# Patient Record
Sex: FEMALE | Race: WHITE | Employment: UNEMPLOYED | URBAN - METROPOLITAN AREA
[De-identification: names, ages, dates, MRNs, and addresses within clinical notes are randomized per-mention and may not be internally consistent; named-entity substitution may affect disease eponyms.]

---

## 2018-07-31 ENCOUNTER — OP HISTORICAL/CONVERTED ENCOUNTER (OUTPATIENT)
Dept: OTHER | Age: 58
End: 2018-07-31

## 2019-04-12 ENCOUNTER — OP HISTORICAL/CONVERTED ENCOUNTER (OUTPATIENT)
Dept: OTHER | Age: 59
End: 2019-04-12

## 2019-08-21 ENCOUNTER — OP HISTORICAL/CONVERTED ENCOUNTER (OUTPATIENT)
Dept: OTHER | Age: 59
End: 2019-08-21

## 2019-09-01 ENCOUNTER — OP HISTORICAL/CONVERTED ENCOUNTER (OUTPATIENT)
Dept: OTHER | Age: 59
End: 2019-09-01

## 2021-08-03 ENCOUNTER — TRANSCRIBE ORDER (OUTPATIENT)
Dept: REGISTRATION | Age: 61
End: 2021-08-03

## 2021-08-03 ENCOUNTER — HOSPITAL ENCOUNTER (OUTPATIENT)
Dept: LAB | Age: 61
Discharge: HOME | End: 2021-08-03
Payer: MEDICAID

## 2021-08-03 ENCOUNTER — HOSPITAL ENCOUNTER (OUTPATIENT)
Dept: GENERAL RADIOLOGY | Age: 61
Discharge: HOME | End: 2021-08-03
Payer: MEDICAID

## 2021-08-03 DIAGNOSIS — R06.02 SHORTNESS OF BREATH: ICD-10-CM

## 2021-08-03 DIAGNOSIS — R06.02 SHORTNESS OF BREATH: Primary | ICD-10-CM

## 2021-08-03 DIAGNOSIS — R06.02 SOB (SHORTNESS OF BREATH): ICD-10-CM

## 2021-08-03 DIAGNOSIS — R06.02 SOB (SHORTNESS OF BREATH): Primary | ICD-10-CM

## 2021-08-03 LAB
ALBUMIN SERPL-MCNC: 4 G/DL (ref 3.5–5)
ALBUMIN/GLOB SERPL: 1 {RATIO} (ref 1.1–2.2)
ALP SERPL-CCNC: 102 U/L (ref 45–117)
ALT SERPL-CCNC: 20 U/L (ref 12–78)
ANION GAP SERPL CALC-SCNC: 5 MMOL/L (ref 5–15)
AST SERPL W P-5'-P-CCNC: 14 U/L (ref 15–37)
BASOPHILS # BLD: 0.1 K/UL (ref 0–0.1)
BASOPHILS NFR BLD: 1 % (ref 0–1)
BILIRUB SERPL-MCNC: 0.8 MG/DL (ref 0.2–1)
BNP SERPL-MCNC: 817 PG/ML
BUN SERPL-MCNC: 17 MG/DL (ref 6–20)
BUN/CREAT SERPL: 16 (ref 12–20)
CA-I BLD-MCNC: 9.7 MG/DL (ref 8.5–10.1)
CHLORIDE SERPL-SCNC: 105 MMOL/L (ref 97–108)
CO2 SERPL-SCNC: 27 MMOL/L (ref 21–32)
CREAT SERPL-MCNC: 1.05 MG/DL (ref 0.55–1.02)
DIFFERENTIAL METHOD BLD: NORMAL
EOSINOPHIL # BLD: 0.1 K/UL (ref 0–0.4)
EOSINOPHIL NFR BLD: 1 % (ref 0–7)
ERYTHROCYTE [DISTWIDTH] IN BLOOD BY AUTOMATED COUNT: 12.5 % (ref 11.5–14.5)
GLOBULIN SER CALC-MCNC: 4.2 G/DL (ref 2–4)
GLUCOSE SERPL-MCNC: 107 MG/DL (ref 65–100)
HCT VFR BLD AUTO: 43.7 % (ref 35–47)
HGB BLD-MCNC: 14.2 G/DL (ref 11.5–16)
IMM GRANULOCYTES # BLD AUTO: 0 K/UL (ref 0–0.04)
IMM GRANULOCYTES NFR BLD AUTO: 0 % (ref 0–0.5)
LYMPHOCYTES # BLD: 1.4 K/UL (ref 0.8–3.5)
LYMPHOCYTES NFR BLD: 19 % (ref 12–49)
MCH RBC QN AUTO: 31.8 PG (ref 26–34)
MCHC RBC AUTO-ENTMCNC: 32.5 G/DL (ref 30–36.5)
MCV RBC AUTO: 97.8 FL (ref 80–99)
MONOCYTES # BLD: 0.4 K/UL (ref 0–1)
MONOCYTES NFR BLD: 6 % (ref 5–13)
NEUTS SEG # BLD: 5.1 K/UL (ref 1.8–8)
NEUTS SEG NFR BLD: 73 % (ref 32–75)
NRBC # BLD: 0 K/UL (ref 0–0.01)
NRBC BLD-RTO: 0 PER 100 WBC
PLATELET # BLD AUTO: 314 K/UL (ref 150–400)
PMV BLD AUTO: 10.3 FL (ref 8.9–12.9)
POTASSIUM SERPL-SCNC: 4.1 MMOL/L (ref 3.5–5.1)
PROT SERPL-MCNC: 8.2 G/DL (ref 6.4–8.2)
RBC # BLD AUTO: 4.47 M/UL (ref 3.8–5.2)
SODIUM SERPL-SCNC: 137 MMOL/L (ref 136–145)
TSH SERPL DL<=0.05 MIU/L-ACNC: 1.98 UIU/ML (ref 0.36–3.74)
WBC # BLD AUTO: 7 K/UL (ref 3.6–11)

## 2021-08-03 PROCEDURE — 83880 ASSAY OF NATRIURETIC PEPTIDE: CPT

## 2021-08-03 PROCEDURE — 36415 COLL VENOUS BLD VENIPUNCTURE: CPT

## 2021-08-03 PROCEDURE — 85025 COMPLETE CBC W/AUTO DIFF WBC: CPT

## 2021-08-03 PROCEDURE — 84439 ASSAY OF FREE THYROXINE: CPT

## 2021-08-03 PROCEDURE — 71046 X-RAY EXAM CHEST 2 VIEWS: CPT

## 2021-08-03 PROCEDURE — 80053 COMPREHEN METABOLIC PANEL: CPT

## 2021-08-03 PROCEDURE — 84443 ASSAY THYROID STIM HORMONE: CPT

## 2021-08-04 LAB — T4 FREE SERPL-MCNC: 1 NG/DL (ref 0.8–1.5)

## 2021-11-22 ENCOUNTER — APPOINTMENT (OUTPATIENT)
Dept: GENERAL RADIOLOGY | Age: 61
End: 2021-11-22
Attending: STUDENT IN AN ORGANIZED HEALTH CARE EDUCATION/TRAINING PROGRAM
Payer: MEDICAID

## 2021-11-22 ENCOUNTER — HOSPITAL ENCOUNTER (OUTPATIENT)
Age: 61
Setting detail: OBSERVATION
Discharge: HOME | End: 2021-11-24
Attending: STUDENT IN AN ORGANIZED HEALTH CARE EDUCATION/TRAINING PROGRAM | Admitting: INTERNAL MEDICINE
Payer: MEDICAID

## 2021-11-22 DIAGNOSIS — M25.562 ARTHRALGIA OF LEFT LOWER LEG: ICD-10-CM

## 2021-11-22 DIAGNOSIS — R26.2 AMBULATORY DYSFUNCTION: Primary | ICD-10-CM

## 2021-11-22 LAB
ALBUMIN SERPL-MCNC: 3.2 G/DL (ref 3.5–5)
ALBUMIN/GLOB SERPL: 0.9 {RATIO} (ref 1.1–2.2)
ALP SERPL-CCNC: 142 U/L (ref 45–117)
ALT SERPL-CCNC: 13 U/L (ref 12–78)
ANION GAP SERPL CALC-SCNC: 6 MMOL/L (ref 5–15)
AST SERPL W P-5'-P-CCNC: 18 U/L (ref 15–37)
BASOPHILS # BLD: 0.1 K/UL (ref 0–0.1)
BASOPHILS NFR BLD: 1 % (ref 0–1)
BILIRUB SERPL-MCNC: 0.4 MG/DL (ref 0.2–1)
BUN SERPL-MCNC: 12 MG/DL (ref 6–20)
BUN/CREAT SERPL: 17 (ref 12–20)
CA-I BLD-MCNC: 9.1 MG/DL (ref 8.5–10.1)
CHLORIDE SERPL-SCNC: 112 MMOL/L (ref 97–108)
CO2 SERPL-SCNC: 24 MMOL/L (ref 21–32)
CREAT SERPL-MCNC: 0.7 MG/DL (ref 0.55–1.02)
D DIMER PPP FEU-MCNC: 2.22 UG/ML(FEU)
DIFFERENTIAL METHOD BLD: ABNORMAL
EOSINOPHIL # BLD: 0.1 K/UL (ref 0–0.4)
EOSINOPHIL NFR BLD: 1 % (ref 0–7)
ERYTHROCYTE [DISTWIDTH] IN BLOOD BY AUTOMATED COUNT: 14 % (ref 11.5–14.5)
GLOBULIN SER CALC-MCNC: 3.4 G/DL (ref 2–4)
GLUCOSE SERPL-MCNC: 98 MG/DL (ref 65–100)
HCT VFR BLD AUTO: 36 % (ref 35–47)
HGB BLD-MCNC: 11.2 G/DL (ref 11.5–16)
IMM GRANULOCYTES # BLD AUTO: 0 K/UL (ref 0–0.04)
IMM GRANULOCYTES NFR BLD AUTO: 1 % (ref 0–0.5)
INR PPP: 3.1 (ref 0.9–1.1)
LYMPHOCYTES # BLD: 1.1 K/UL (ref 0.8–3.5)
LYMPHOCYTES NFR BLD: 18 % (ref 12–49)
MCH RBC QN AUTO: 30.2 PG (ref 26–34)
MCHC RBC AUTO-ENTMCNC: 31.1 G/DL (ref 30–36.5)
MCV RBC AUTO: 97 FL (ref 80–99)
MONOCYTES # BLD: 0.6 K/UL (ref 0–1)
MONOCYTES NFR BLD: 10 % (ref 5–13)
NEUTS SEG # BLD: 4.5 K/UL (ref 1.8–8)
NEUTS SEG NFR BLD: 69 % (ref 32–75)
NRBC # BLD: 0 K/UL (ref 0–0.01)
NRBC BLD-RTO: 0 PER 100 WBC
PLATELET # BLD AUTO: 314 K/UL (ref 150–400)
PMV BLD AUTO: 9 FL (ref 8.9–12.9)
POTASSIUM SERPL-SCNC: 3.7 MMOL/L (ref 3.5–5.1)
PROT SERPL-MCNC: 6.6 G/DL (ref 6.4–8.2)
PROTHROMBIN TIME: 31.1 SEC (ref 11.9–14.7)
RBC # BLD AUTO: 3.71 M/UL (ref 3.8–5.2)
SODIUM SERPL-SCNC: 142 MMOL/L (ref 136–145)
WBC # BLD AUTO: 6.4 K/UL (ref 3.6–11)

## 2021-11-22 PROCEDURE — 73562 X-RAY EXAM OF KNEE 3: CPT

## 2021-11-22 PROCEDURE — 99284 EMERGENCY DEPT VISIT MOD MDM: CPT

## 2021-11-22 PROCEDURE — 85610 PROTHROMBIN TIME: CPT

## 2021-11-22 PROCEDURE — 85025 COMPLETE CBC W/AUTO DIFF WBC: CPT

## 2021-11-22 PROCEDURE — 80053 COMPREHEN METABOLIC PANEL: CPT

## 2021-11-22 PROCEDURE — 85379 FIBRIN DEGRADATION QUANT: CPT

## 2021-11-22 PROCEDURE — 74011250637 HC RX REV CODE- 250/637: Performed by: STUDENT IN AN ORGANIZED HEALTH CARE EDUCATION/TRAINING PROGRAM

## 2021-11-22 PROCEDURE — 36415 COLL VENOUS BLD VENIPUNCTURE: CPT

## 2021-11-22 RX ORDER — OXYCODONE AND ACETAMINOPHEN 5; 325 MG/1; MG/1
1 TABLET ORAL
Status: COMPLETED | OUTPATIENT
Start: 2021-11-22 | End: 2021-11-22

## 2021-11-22 RX ORDER — FLUTICASONE PROPIONATE AND SALMETEROL 250; 50 UG/1; UG/1
POWDER RESPIRATORY (INHALATION)
COMMUNITY

## 2021-11-22 RX ORDER — TOPIRAMATE 25 MG/1
25 TABLET ORAL 2 TIMES DAILY WITH MEALS
COMMUNITY
Start: 2021-11-18

## 2021-11-22 RX ORDER — LORAZEPAM 1 MG/1
1 TABLET ORAL EVERY 4 HOURS
COMMUNITY
Start: 2021-11-14

## 2021-11-22 RX ORDER — ALBUTEROL SULFATE 0.83 MG/ML
SOLUTION RESPIRATORY (INHALATION)
COMMUNITY

## 2021-11-22 RX ORDER — OXYCODONE AND ACETAMINOPHEN 10; 325 MG/1; MG/1
1 TABLET ORAL
COMMUNITY
Start: 2021-11-14

## 2021-11-22 RX ORDER — LORATADINE 10 MG/1
TABLET ORAL
COMMUNITY
Start: 2021-11-14

## 2021-11-22 RX ORDER — ALBUTEROL SULFATE 90 UG/1
AEROSOL, METERED RESPIRATORY (INHALATION)
COMMUNITY

## 2021-11-22 RX ORDER — CHOLECALCIFEROL (VITAMIN D3) 125 MCG
5000 CAPSULE ORAL DAILY
COMMUNITY
Start: 2021-11-17

## 2021-11-22 RX ORDER — NALOXONE HYDROCHLORIDE 4 MG/.1ML
SPRAY NASAL
COMMUNITY
Start: 2021-08-30

## 2021-11-22 RX ORDER — DILTIAZEM HYDROCHLORIDE 360 MG/1
360 CAPSULE, EXTENDED RELEASE ORAL DAILY
COMMUNITY
Start: 2021-10-01

## 2021-11-22 RX ORDER — WARFARIN 3 MG/1
3 TABLET ORAL DAILY
COMMUNITY

## 2021-11-22 RX ORDER — LEVOTHYROXINE SODIUM 50 UG/1
50 TABLET ORAL
COMMUNITY
Start: 2021-10-10

## 2021-11-22 RX ADMIN — OXYCODONE AND ACETAMINOPHEN 1 TABLET: 5; 325 TABLET ORAL at 23:52

## 2021-11-22 NOTE — Clinical Note
Patient Class[de-identified] OBSERVATION [254]   Type of Bed: Medical [8]   Reason for Observation: Ambulatory Dysfunction   Admitting Diagnosis: Ambulatory dysfunction [[id number]]   Admitting Physician: Roise Goldberg [[id number]]   Attending Physician: Roise Goldberg [[id number]]

## 2021-11-23 ENCOUNTER — APPOINTMENT (OUTPATIENT)
Dept: NON INVASIVE DIAGNOSTICS | Age: 61
End: 2021-11-23
Attending: INTERNAL MEDICINE
Payer: MEDICAID

## 2021-11-23 PROBLEM — R26.2 AMBULATORY DYSFUNCTION: Status: ACTIVE | Noted: 2021-11-23

## 2021-11-23 LAB — MRSA DNA SPEC QL NAA+PROBE: NOT DETECTED

## 2021-11-23 PROCEDURE — 93971 EXTREMITY STUDY: CPT

## 2021-11-23 PROCEDURE — 94640 AIRWAY INHALATION TREATMENT: CPT

## 2021-11-23 PROCEDURE — G0378 HOSPITAL OBSERVATION PER HR: HCPCS

## 2021-11-23 PROCEDURE — 74011250636 HC RX REV CODE- 250/636: Performed by: INTERNAL MEDICINE

## 2021-11-23 PROCEDURE — 87641 MR-STAPH DNA AMP PROBE: CPT

## 2021-11-23 PROCEDURE — 96374 THER/PROPH/DIAG INJ IV PUSH: CPT

## 2021-11-23 PROCEDURE — 74011250637 HC RX REV CODE- 250/637: Performed by: INTERNAL MEDICINE

## 2021-11-23 RX ORDER — WARFARIN 2.5 MG/1
5 TABLET ORAL DAILY
Status: DISCONTINUED | OUTPATIENT
Start: 2021-11-23 | End: 2021-11-23

## 2021-11-23 RX ORDER — FUROSEMIDE 10 MG/ML
40 INJECTION INTRAMUSCULAR; INTRAVENOUS ONCE
Status: COMPLETED | OUTPATIENT
Start: 2021-11-23 | End: 2021-11-23

## 2021-11-23 RX ORDER — DILTIAZEM HYDROCHLORIDE 120 MG/1
360 CAPSULE, COATED, EXTENDED RELEASE ORAL DAILY
Status: DISCONTINUED | OUTPATIENT
Start: 2021-11-23 | End: 2021-11-24 | Stop reason: HOSPADM

## 2021-11-23 RX ORDER — TOPIRAMATE 25 MG/1
25 TABLET ORAL 2 TIMES DAILY WITH MEALS
Status: DISCONTINUED | OUTPATIENT
Start: 2021-11-23 | End: 2021-11-24 | Stop reason: HOSPADM

## 2021-11-23 RX ORDER — CHOLECALCIFEROL TAB 125 MCG (5000 UNIT) 125 MCG
5000 TAB ORAL DAILY
Status: DISCONTINUED | OUTPATIENT
Start: 2021-11-23 | End: 2021-11-24 | Stop reason: HOSPADM

## 2021-11-23 RX ORDER — METOPROLOL TARTRATE 50 MG/1
100 TABLET ORAL 2 TIMES DAILY
Status: DISCONTINUED | OUTPATIENT
Start: 2021-11-23 | End: 2021-11-24 | Stop reason: HOSPADM

## 2021-11-23 RX ORDER — WARFARIN 1 MG/1
1.5 TABLET ORAL DAILY
Status: DISCONTINUED | OUTPATIENT
Start: 2021-11-23 | End: 2021-11-23

## 2021-11-23 RX ORDER — PAROXETINE 10 MG/1
10 TABLET, FILM COATED ORAL DAILY
Status: DISCONTINUED | OUTPATIENT
Start: 2021-11-23 | End: 2021-11-24 | Stop reason: HOSPADM

## 2021-11-23 RX ORDER — LORAZEPAM 1 MG/1
1 TABLET ORAL
Status: DISCONTINUED | OUTPATIENT
Start: 2021-11-23 | End: 2021-11-24 | Stop reason: HOSPADM

## 2021-11-23 RX ORDER — WARFARIN 3 MG/1
1.5 TABLET ORAL ONCE
Status: COMPLETED | OUTPATIENT
Start: 2021-11-23 | End: 2021-11-23

## 2021-11-23 RX ORDER — BUDESONIDE AND FORMOTEROL FUMARATE DIHYDRATE 160; 4.5 UG/1; UG/1
1 AEROSOL RESPIRATORY (INHALATION)
Status: DISCONTINUED | OUTPATIENT
Start: 2021-11-23 | End: 2021-11-24 | Stop reason: HOSPADM

## 2021-11-23 RX ORDER — LEVOTHYROXINE SODIUM 25 UG/1
50 TABLET ORAL
Status: DISCONTINUED | OUTPATIENT
Start: 2021-11-23 | End: 2021-11-24 | Stop reason: HOSPADM

## 2021-11-23 RX ORDER — ALBUTEROL SULFATE 0.83 MG/ML
2.5 SOLUTION RESPIRATORY (INHALATION)
Status: DISCONTINUED | OUTPATIENT
Start: 2021-11-23 | End: 2021-11-24 | Stop reason: HOSPADM

## 2021-11-23 RX ORDER — CYCLOBENZAPRINE HCL 10 MG
10 TABLET ORAL 3 TIMES DAILY
Status: DISCONTINUED | OUTPATIENT
Start: 2021-11-23 | End: 2021-11-24 | Stop reason: HOSPADM

## 2021-11-23 RX ORDER — CETIRIZINE HCL 10 MG
10 TABLET ORAL DAILY
Status: DISCONTINUED | OUTPATIENT
Start: 2021-11-23 | End: 2021-11-24 | Stop reason: HOSPADM

## 2021-11-23 RX ORDER — OXYCODONE AND ACETAMINOPHEN 5; 325 MG/1; MG/1
2 TABLET ORAL
Status: DISCONTINUED | OUTPATIENT
Start: 2021-11-23 | End: 2021-11-24 | Stop reason: HOSPADM

## 2021-11-23 RX ADMIN — Medication 5000 UNITS: at 10:30

## 2021-11-23 RX ADMIN — BUDESONIDE AND FORMOTEROL FUMARATE DIHYDRATE 1 PUFF: 160; 4.5 AEROSOL RESPIRATORY (INHALATION) at 10:00

## 2021-11-23 RX ADMIN — BUDESONIDE AND FORMOTEROL FUMARATE DIHYDRATE 1 PUFF: 160; 4.5 AEROSOL RESPIRATORY (INHALATION) at 18:14

## 2021-11-23 RX ADMIN — TOPIRAMATE 25 MG: 25 TABLET, FILM COATED ORAL at 10:31

## 2021-11-23 RX ADMIN — TOPIRAMATE 25 MG: 25 TABLET, FILM COATED ORAL at 17:29

## 2021-11-23 RX ADMIN — METOPROLOL TARTRATE 100 MG: 50 TABLET, FILM COATED ORAL at 10:30

## 2021-11-23 RX ADMIN — METOPROLOL TARTRATE 100 MG: 50 TABLET, FILM COATED ORAL at 22:06

## 2021-11-23 RX ADMIN — WARFARIN SODIUM 1.5 MG: 3 TABLET ORAL at 17:29

## 2021-11-23 RX ADMIN — DILTIAZEM HYDROCHLORIDE 360 MG: 120 CAPSULE, COATED, EXTENDED RELEASE ORAL at 10:31

## 2021-11-23 RX ADMIN — FUROSEMIDE 40 MG: 10 INJECTION, SOLUTION INTRAMUSCULAR; INTRAVENOUS at 10:56

## 2021-11-23 RX ADMIN — OXYCODONE AND ACETAMINOPHEN 2 TABLET: 5; 325 TABLET ORAL at 15:28

## 2021-11-23 RX ADMIN — OXYCODONE AND ACETAMINOPHEN 2 TABLET: 5; 325 TABLET ORAL at 20:05

## 2021-11-23 RX ADMIN — LEVOTHYROXINE SODIUM 50 MCG: 0.03 TABLET ORAL at 10:30

## 2021-11-23 RX ADMIN — CETIRIZINE HYDROCHLORIDE 10 MG: 10 TABLET, FILM COATED ORAL at 10:35

## 2021-11-23 RX ADMIN — OXYCODONE AND ACETAMINOPHEN 2 TABLET: 5; 325 TABLET ORAL at 10:55

## 2021-11-23 NOTE — PROGRESS NOTES
Writer met with patient at bedside. Observation notice provided in writing to patient and/or caregiver as well as verbal explanation of the policy. Patients who are in outpatient status also receive the Observation notice. Patient is going to [facility]. Writer will meet with patient when she returns. 1  Met with patient at bedside to discuss discharge planning. Patient states she has had [facility] before but cannot remember the name. She says she was not pleased with them and they came last Thursday to see her. Pt would prefer to go to a facility if she cannot go home, however is open to accepting [facility] if needed again. She says she does not like people to come to her home. She will wait until after PT/OT recommendations to decidie what she would like to do.      Claudia Thompson

## 2021-11-23 NOTE — PROGRESS NOTES
PT consult received and acknowledged . However , there are orders for duplex venous scan for L Le to r/o DVT. Hold PT at this time. PT will continue to  monitor and reattempt for PT eval when medically appropriate .  Thanks

## 2021-11-23 NOTE — PROGRESS NOTES
Problem: Pressure Injury - Risk of  Goal: *Prevention of pressure injury  Description: Document Alan Scale and appropriate interventions in the flowsheet. Outcome: Progressing Towards Goal  Note: Pressure Injury Interventions: Activity Interventions: PT/OT evaluation, Assess need for specialty bed    Mobility Interventions: HOB 30 degrees or less, PT/OT evaluation, Turn and reposition approx. every two hours(pillow and wedges), Assess need for specialty bed    Nutrition Interventions: Document food/fluid/supplement intake, Offer support with meals,snacks and hydration    Friction and Shear Interventions: HOB 30 degrees or less, Minimize layers                Problem: Patient Education: Go to Patient Education Activity  Goal: Patient/Family Education  Outcome: Progressing Towards Goal     Problem: Falls - Risk of  Goal: *Absence of Falls  Description: Document Zaid Fall Risk and appropriate interventions in the flowsheet.   Outcome: Progressing Towards Goal  Note: Fall Risk Interventions:  Mobility Interventions: Bed/chair exit alarm, OT consult for ADLs, PT Consult for mobility concerns, Patient to call before getting OOB, PT Consult for assist device competence         Medication Interventions: Bed/chair exit alarm, Patient to call before getting OOB, Teach patient to arise slowly    Elimination Interventions: Bed/chair exit alarm, Call light in reach, Patient to call for help with toileting needs    History of Falls Interventions: Bed/chair exit alarm, Door open when patient unattended, Room close to nurse's station         Problem: Patient Education: Go to Patient Education Activity  Goal: Patient/Family Education  Outcome: Progressing Towards Goal

## 2021-11-23 NOTE — PROGRESS NOTES
OT evaluation received and acknowledged. Per chart review, pt is pending duplex US of L LE to r/o possible DVT. OT to hold at this time and follow up with pt for evaluation at a later time pending results/as medically appropriate. Thank you.

## 2021-11-23 NOTE — H&P
History and Physical (Inpatient)    Patient:    Tato Saunders   64 y.o. Chief Complaint:   Chief Complaint   Patient presents with    Knee Pain     left         History of Present Illness: This is a 70-year-old morbidly obese  female with history of atrial fibrillation, avascular necrosis status post left hip replacement recently on  has been doing well developed acute swelling as per patient worse than after surgery difficulty walking came to the emergency room. She denies any shortness of breath or chest pain or fall. She denies any fever chills. She states her left leg feels like it is 20 pounds heavier. She has significant left knee pain and she is afraid that she has blood clot. She could not transfer and it took her a long time to be able to walk which is a setback. There is no orthopnea or PND. ROS: Denies any chronic headache blurry vision URI symptoms she denies any chest pain palpitation orthopnea PND abdominal pain nausea vomiting diarrhea constipation no bloody bowel movements. Her hip pain is better she reports left knee pain. Denies any urinary symptoms of dysuria urgency patient has functional incontinence. She denies any fever chills he reports weakness. History:  Past Medical History:   Diagnosis Date    Hypertension    Avascular necrosis of the hip. Morbid obesity  Atrial fibrillation  Panic attack  Asthma  Osteoarthritis of hip and knee   history of sepsis  Toe infection      Past surgical history status post right hip total hip replacement   Status post left total hip replacement 2021  Status post tonsillectomy      Social history  Single, , lives alone no children. On disability. She quit tobacco use does not drink alcohol.     Family History   Problem Relation Age of Onset   Elke Stabs Thyroid Disease Father    Mother is alive at 80 with a history of atrial fibrillation  Father  at 76 of lung fibrosis /CVA    Allergies:  No Known Allergies    Current Medications:    Current Facility-Administered Medications:     influenza vaccine 2021-22 (6 mos+)(PF) (FLUARIX/FLULAVAL/FLUZONE QUAD) injection 0.5 mL, 1 Each, IntraMUSCular, PRIOR TO DISCHARGE, Colby Stokes MD    oxyCODONE-acetaminophen (PERCOCET) 5-325 mg per tablet 2 Tablet, 2 Tablet, Oral, Q4H PRN, Colby Stokes MD    cholecalciferol (VITAMIN D3) (5000 Units/125 mcg) tablet 5,000 Units, 5,000 Units, Oral, DAILY, Colby RAY MD, 5,000 Units at 11/23/21 1030    levothyroxine (SYNTHROID) tablet 50 mcg, 50 mcg, Oral, ACB, Andrez Diop MD, 50 mcg at 11/23/21 1030    cetirizine (ZYRTEC) tablet 10 mg, 10 mg, Oral, DAILY, Andrez Diop MD, 10 mg at 11/23/21 1035    metoprolol tartrate (LOPRESSOR) tablet 100 mg, 100 mg, Oral, BID, Andrez Diop MD, 100 mg at 11/23/21 1030    PARoxetine (PAXIL) tablet 10 mg, 10 mg, Oral, DAILY, Andrez Diop MD    topiramate (TOPAMAX) tablet 25 mg, 25 mg, Oral, BID WITH MEALS, Andrez Diop MD, 25 mg at 11/23/21 1031    dilTIAZem ER (CARDIZEM CD) capsule 360 mg, 360 mg, Oral, DAILY, Andrez Diop MD, 360 mg at 11/23/21 1031    budesonide-formoteroL (SYMBICORT) 160-4.5 mcg/actuation HFA inhaler 1 Puff, 1 Puff, Inhalation, BID RT, Andrez Diop MD    albuterol (PROVENTIL VENTOLIN) nebulizer solution 2.5 mg, 2.5 mg, Nebulization, Q6H PRN, Colby Stokes MD    WARFARIN INFORMATION NOTE (COUMADIN), , Other, PRN, Colby Stokes MD    warfarin (COUMADIN) tablet 1.5 mg, 1.5 mg, Oral, ONCE, Adnrez Diop MD    furosemide (LASIX) injection 40 mg, 40 mg, IntraVENous, ONCE, Colby Stokes MD       Physical Exam:  Visit Vitals  BP (!) 151/84 (BP 1 Location: Right upper arm, BP Patient Position: At rest)   Pulse (!) 101   Temp 98.2 °F (36.8 °C)   Resp 18   Ht 5' 8\" (1.727 m)   Wt 117.9 kg (260 lb)   SpO2 98%   BMI 39.53 kg/m²     On examination she is an elderly looking obese  female lying in bed comfortably no respiratory distress  HEENT metabolic atraumatic anicteric sclera oral mucosa dry  Neck no distended neck vein  Lungs are clear bilaterally no wheeze or crackles  Heart S1-S2 irregularly irregular  Abdomen soft obese nontender nondistended positive bowel sounds  Left leg with significant edema extending from the thigh all the way to her ankle. Recent left hip surgery no sign of infection or wound dehiscence. CNS alert and oriented moves extremities with some pain on the left. Psych cooperative but anxious    Impression this is a 70-year-old morbidly obese  female with history of A. fib hypertension recent left hip surgery presents with inability to walk pain on the knee concern about blood clot although INR is therapeutic at 3.2. Findings/Diagnosis: Acute inability to walk with left knee pain  Left knee pain  Recent left hip surgery   left leg swelling  Atrial fibrillation stable  Hypertension  Morbid obesity  History of panic attack admitted to the hospital        Laboratory:      Recent Results (from the past 24 hour(s))   D DIMER    Collection Time: 11/22/21  9:20 PM   Result Value Ref Range    D DIMER 2.22 (H) <0.50 ug/ml(FEU)   CBC WITH AUTOMATED DIFF    Collection Time: 11/22/21  9:20 PM   Result Value Ref Range    WBC 6.4 3.6 - 11.0 K/uL    RBC 3.71 (L) 3.80 - 5.20 M/uL    HGB 11.2 (L) 11.5 - 16.0 g/dL    HCT 36.0 35.0 - 47.0 %    MCV 97.0 80.0 - 99.0 FL    MCH 30.2 26.0 - 34.0 PG    MCHC 31.1 30.0 - 36.5 g/dL    RDW 14.0 11.5 - 14.5 %    PLATELET 038 536 - 765 K/uL    MPV 9.0 8.9 - 12.9 FL    NRBC 0.0 0.0  WBC    ABSOLUTE NRBC 0.00 0.00 - 0.01 K/uL    NEUTROPHILS 69 32 - 75 %    LYMPHOCYTES 18 12 - 49 %    MONOCYTES 10 5 - 13 %    EOSINOPHILS 1 0 - 7 %    BASOPHILS 1 0 - 1 %    IMMATURE GRANULOCYTES 1 (H) 0 - 0.5 %    ABS. NEUTROPHILS 4.5 1.8 - 8.0 K/UL    ABS.  LYMPHOCYTES 1.1 0.8 - 3.5 K/UL ABS. MONOCYTES 0.6 0.0 - 1.0 K/UL    ABS. EOSINOPHILS 0.1 0.0 - 0.4 K/UL    ABS. BASOPHILS 0.1 0.0 - 0.1 K/UL    ABS. IMM. GRANS. 0.0 0.00 - 0.04 K/UL    DF AUTOMATED     METABOLIC PANEL, COMPREHENSIVE    Collection Time: 11/22/21  9:20 PM   Result Value Ref Range    Sodium 142 136 - 145 mmol/L    Potassium 3.7 3.5 - 5.1 mmol/L    Chloride 112 (H) 97 - 108 mmol/L    CO2 24 21 - 32 mmol/L    Anion gap 6 5 - 15 mmol/L    Glucose 98 65 - 100 mg/dL    BUN 12 6 - 20 mg/dL    Creatinine 0.70 0.55 - 1.02 mg/dL    BUN/Creatinine ratio 17 12 - 20      GFR est AA >60 >60 ml/min/1.73m2    GFR est non-AA >60 >60 ml/min/1.73m2    Calcium 9.1 8.5 - 10.1 mg/dL    Bilirubin, total 0.4 0.2 - 1.0 mg/dL    AST (SGOT) 18 15 - 37 U/L    ALT (SGPT) 13 12 - 78 U/L    Alk. phosphatase 142 (H) 45 - 117 U/L    Protein, total 6.6 6.4 - 8.2 g/dL    Albumin 3.2 (L) 3.5 - 5.0 g/dL    Globulin 3.4 2.0 - 4.0 g/dL    A-G Ratio 0.9 (L) 1.1 - 2.2     PROTHROMBIN TIME + INR    Collection Time: 11/22/21  9:20 PM   Result Value Ref Range    Prothrombin time 31.1 (H) 11.9 - 14.7 sec    INR 3.1 (H) 0.9 - 1.1     MRSA SCREEN - PCR (NASAL)    Collection Time: 11/23/21  1:56 AM   Result Value Ref Range    MRSA by PCR, Nasal Not Detected Not Detected         XR KNEE LT 3 V   Final Result   No acute fracture      DUPLEX LOWER EXT ARTERY LEFT    (Results Pending)       Plan of Care/Planned Procedure: Observe. She vehemently does not want to go to a nursing home even though I did tell her that if she cannot transfer she is not safe at home by self. We will get physical therapy to continue her routine medication. I told her about scheduled Percocet and Ativan combined together as concerning in the dose that she takes. I would repeat ultrasound of her left lower extremities for her reassurance even though I do not think she has a blood clot. She does have still ecchymosis and swelling and possibly hematoma in the left hip that is causing the swelling. Consult case management. Discussed with patient in front of her nurse.

## 2021-11-23 NOTE — ROUTINE PROCESS
TRANSFER - OUT REPORT:    Verbal report given toRN  cecilia Haines  being transferred to [address] [place] [address] ([facility]) for routine progression of care       Report consisted of patients Situation, Background, Assessment and   Recommendations(SBAR). Information from the following report(s) SBAR and Recent Results was reviewed with the receiving nurse. Opportunity for questions and clarification was provided.       Patient transported with:   Registered Nurse

## 2021-11-23 NOTE — PROGRESS NOTES
Problem: Falls - Risk of  Goal: *Absence of Falls  Description: Document Anderson Walker Fall Risk and appropriate interventions in the flowsheet.   Outcome: Progressing Towards Goal  Note: Fall Risk Interventions:  Mobility Interventions: Bed/chair exit alarm, OT consult for ADLs, Patient to call before getting OOB, PT Consult for mobility concerns, PT Consult for assist device competence         Medication Interventions: Bed/chair exit alarm, Patient to call before getting OOB, Teach patient to arise slowly    Elimination Interventions: Bed/chair exit alarm, Call light in reach, Patient to call for help with toileting needs    History of Falls Interventions: Bed/chair exit alarm, Door open when patient unattended, Room close to nurse's station

## 2021-11-23 NOTE — ED NOTES
Attempted to walk patient to wheelchair to be discharged and patient as unable to ambulate or put weight onto left leg. Arline Garcia MD notified.

## 2021-11-23 NOTE — PROGRESS NOTES
Patient has dry scabs on the left hip from surgery, rashes on bilateral inguinal area, scattered, dry scratches on bilateral lower extremities and dry feet. No other skin issues noted.

## 2021-11-23 NOTE — ED PROVIDER NOTES
EMERGENCY DEPARTMENT HISTORY AND PHYSICAL EXAM      Date: 11/22/2021  Patient Name: Rupert Mendoza      History of Presenting Illness     Chief Complaint   Patient presents with    Knee Pain     left       History Provided By: Patient    HPI: Rupert Mendoza, 64 y.o. female with PMH as below who recently underwent left hip replacement presenting with left knee pain. Pain has been slowly worsening over the course of the last 3 weeks. She has been doing physical therapy which has aggravated her pain. She saying that she has significant pain that she is unable to stand due to pain. She is currently on Percocet for pain control postoperatively and lorazepam for spasms that she developed following her hip replacement. No fever, chills, or joint swelling. Patient had chronic swelling of left lower extremity without significant change recently. Of note, patient reports that she had a vascular necrosis in that knee from before. She does have history of chronic knee pain. Aside from physical therapy as strenuous event, patient did not disclose any additional factors. She has not had any traumas, falls, redness, or change in the color. There are no other complaints, changes, or physical findings at this time.     PCP: Sam Goetz MD    Current Facility-Administered Medications   Medication Dose Route Frequency Provider Last Rate Last Admin    influenza vaccine 2021-22 (6 mos+)(PF) (FLUARIX/FLULAVAL/FLUZONE QUAD) injection 0.5 mL  1 Each IntraMUSCular PRIOR TO DISCHARGE Sam Goetz MD        oxyCODONE-acetaminophen (PERCOCET) 5-325 mg per tablet 2 Tablet  2 Tablet Oral Q4H PRN Sam Goetz MD         Percocet     Past History     Past Medical History:  Past Medical History:   Diagnosis Date    Hypertension        Past Surgical History:  Past Surgical History:   Procedure Laterality Date    HX TONSILLECTOMY         Family History:  Family History   Problem Relation Age of Onset    Thyroid Disease Father        Social History:  Social History     Tobacco Use    Smoking status: Current Every Day Smoker     Packs/day: 0.25    Smokeless tobacco: Not on file   Substance Use Topics    Alcohol use: No    Drug use: No       Allergies:  No Known Allergies      Review of Systems     Review of Systems   Constitutional: Negative for activity change and fever. HENT: Negative for congestion and rhinorrhea. Eyes: Negative for discharge and visual disturbance. Respiratory: Negative for chest tightness and shortness of breath. Cardiovascular: Positive for leg swelling (chronic). Negative for chest pain. Gastrointestinal: Negative for abdominal pain and vomiting. Genitourinary: Negative for dysuria and flank pain. Musculoskeletal: Positive for arthralgias and gait problem. Skin: Negative for rash and wound. Neurological: Negative for dizziness, weakness and headaches. Physical Exam     Physical Exam  Constitutional:       General: She is not in acute distress. Appearance: She is obese. She is not ill-appearing. HENT:      Head: Normocephalic and atraumatic. Nose: No congestion or rhinorrhea. Eyes:      Extraocular Movements: Extraocular movements intact. Conjunctiva/sclera: Conjunctivae normal.   Cardiovascular:      Rate and Rhythm: Normal rate and regular rhythm. Pulmonary:      Effort: Pulmonary effort is normal.      Breath sounds: Normal breath sounds. Abdominal:      Palpations: Abdomen is soft. Tenderness: There is no abdominal tenderness. Musculoskeletal:      Right hip: Normal.      Left hip: Normal.      Right knee: Normal.      Left knee: No swelling, deformity, effusion, erythema, ecchymosis, lacerations, bony tenderness or crepitus. Decreased range of motion. Tenderness present. Normal alignment.       Right lower leg: Normal.      Left lower leg: Normal.      Comments: Right hip had a well-healed surgical scar that is several years old.  Left hip had healing surgical scar from several weeks ago that appears CDI. Skin:     General: Skin is warm and dry. Neurological:      Mental Status: She is alert and oriented to person, place, and time. Lab and Diagnostic Study Results     Labs -     Recent Results (from the past 12 hour(s))   D DIMER    Collection Time: 11/22/21  9:20 PM   Result Value Ref Range    D DIMER 2.22 (H) <0.50 ug/ml(FEU)   CBC WITH AUTOMATED DIFF    Collection Time: 11/22/21  9:20 PM   Result Value Ref Range    WBC 6.4 3.6 - 11.0 K/uL    RBC 3.71 (L) 3.80 - 5.20 M/uL    HGB 11.2 (L) 11.5 - 16.0 g/dL    HCT 36.0 35.0 - 47.0 %    MCV 97.0 80.0 - 99.0 FL    MCH 30.2 26.0 - 34.0 PG    MCHC 31.1 30.0 - 36.5 g/dL    RDW 14.0 11.5 - 14.5 %    PLATELET 218 256 - 416 K/uL    MPV 9.0 8.9 - 12.9 FL    NRBC 0.0 0.0  WBC    ABSOLUTE NRBC 0.00 0.00 - 0.01 K/uL    NEUTROPHILS 69 32 - 75 %    LYMPHOCYTES 18 12 - 49 %    MONOCYTES 10 5 - 13 %    EOSINOPHILS 1 0 - 7 %    BASOPHILS 1 0 - 1 %    IMMATURE GRANULOCYTES 1 (H) 0 - 0.5 %    ABS. NEUTROPHILS 4.5 1.8 - 8.0 K/UL    ABS. LYMPHOCYTES 1.1 0.8 - 3.5 K/UL    ABS. MONOCYTES 0.6 0.0 - 1.0 K/UL    ABS. EOSINOPHILS 0.1 0.0 - 0.4 K/UL    ABS. BASOPHILS 0.1 0.0 - 0.1 K/UL    ABS. IMM. GRANS. 0.0 0.00 - 0.04 K/UL    DF AUTOMATED     METABOLIC PANEL, COMPREHENSIVE    Collection Time: 11/22/21  9:20 PM   Result Value Ref Range    Sodium 142 136 - 145 mmol/L    Potassium 3.7 3.5 - 5.1 mmol/L    Chloride 112 (H) 97 - 108 mmol/L    CO2 24 21 - 32 mmol/L    Anion gap 6 5 - 15 mmol/L    Glucose 98 65 - 100 mg/dL    BUN 12 6 - 20 mg/dL    Creatinine 0.70 0.55 - 1.02 mg/dL    BUN/Creatinine ratio 17 12 - 20      GFR est AA >60 >60 ml/min/1.73m2    GFR est non-AA >60 >60 ml/min/1.73m2    Calcium 9.1 8.5 - 10.1 mg/dL    Bilirubin, total 0.4 0.2 - 1.0 mg/dL    AST (SGOT) 18 15 - 37 U/L    ALT (SGPT) 13 12 - 78 U/L    Alk.  phosphatase 142 (H) 45 - 117 U/L    Protein, total 6.6 6.4 - 8.2 g/dL Albumin 3.2 (L) 3.5 - 5.0 g/dL    Globulin 3.4 2.0 - 4.0 g/dL    A-G Ratio 0.9 (L) 1.1 - 2.2     PROTHROMBIN TIME + INR    Collection Time: 11/22/21  9:20 PM   Result Value Ref Range    Prothrombin time 31.1 (H) 11.9 - 14.7 sec    INR 3.1 (H) 0.9 - 1.1         Radiologic Studies -   [unfilled]  CT Results  (Last 48 hours)    None        CXR Results  (Last 48 hours)    None          Medical Decision Making and ED Course   - I am the first and primary provider for this patient AND AM THE PRIMARY PROVIDER OF RECORD. - I reviewed the vital signs, available nursing notes, past medical history, past surgical history, family history and social history. - Initial assessment performed. The patients presenting problems have been discussed, and the staff are in agreement with the care plan formulated and outlined with them. I have encouraged them to ask questions as they arise throughout their visit. Vital Signs-Reviewed the patient's vital signs. Patient Vitals for the past 24 hrs:   Temp Pulse Resp BP SpO2   11/23/21 0233 97.6 °F (36.4 °C) 89 16 139/88 99 %   11/22/21 2118 98.2 °F (36.8 °C) 81 20 132/71 98 %       Records Reviewed: Nursing Notes    Provider Notes (Medical Decision Making):     Patient is presenting with worsening of her left knee pain. Will obtain x-ray to evaluate for possible fractures. Patient does not have any other symptoms concerning for other etiologies. However, if x-ray is unremarkable, patient will need to get an ultrasound in the morning to rule out DVT. ED Course:       ED Course as of 11/23/21 0454   Mon Nov 22, 2021   2344 Work-up was reviewed. Her D-dimer is elevated. She is anticoagulated with Coumadin currently. There is unremarkable. Patient will need ultrasound will come back first thing in the morning to get ultrasound done.   Patient instructed to come back to emergency department if her symptoms worsen or there is any new concerning symptoms between now and the morning. [AA]   Tue Nov 23, 2021   0045 Attempted ambulation. Patient had her pain medications and then failed ambulation due to significant pain. Discussed with the hospitalist that the patient will need to come in for observation for ambulatory dysfunction and possible PT/OT. He is agreeable. [AA]      ED Course User Index  [AA] Madan Marie MD         Disposition     Disposition: Condition stable  Admitted to Observation Unit the case was discussed with the admitting physician Mookie Schmidt MD    Admitted    Diagnosis     Clinical Impression:   1. Ambulatory dysfunction    2. Arthralgia of left lower leg        Attestations: Eloy Miguel MD    Please note that this dictation was completed with [employer], the computer voice recognition software. Quite often unanticipated grammatical, syntax, homophones, and other interpretive errors are inadvertently transcribed by the computer software. Please disregard these errors. Please excuse any errors that have escaped final proofreading. Thank you.

## 2021-11-23 NOTE — ED TRIAGE NOTES
Pt arrives from home via ems c/o left knee pain that began this morning. Pt s/p L THR 10/25. Unable to bare weight.

## 2021-11-24 VITALS
BODY MASS INDEX: 39.4 KG/M2 | RESPIRATION RATE: 18 BRPM | HEART RATE: 76 BPM | SYSTOLIC BLOOD PRESSURE: 137 MMHG | DIASTOLIC BLOOD PRESSURE: 80 MMHG | HEIGHT: 68 IN | OXYGEN SATURATION: 97 % | WEIGHT: 260 LBS | TEMPERATURE: 97.6 F

## 2021-11-24 PROCEDURE — 97530 THERAPEUTIC ACTIVITIES: CPT

## 2021-11-24 PROCEDURE — 94640 AIRWAY INHALATION TREATMENT: CPT

## 2021-11-24 PROCEDURE — 97165 OT EVAL LOW COMPLEX 30 MIN: CPT

## 2021-11-24 PROCEDURE — 97161 PT EVAL LOW COMPLEX 20 MIN: CPT

## 2021-11-24 PROCEDURE — 74011250637 HC RX REV CODE- 250/637: Performed by: INTERNAL MEDICINE

## 2021-11-24 PROCEDURE — G0378 HOSPITAL OBSERVATION PER HR: HCPCS

## 2021-11-24 RX ADMIN — OXYCODONE AND ACETAMINOPHEN 2 TABLET: 5; 325 TABLET ORAL at 01:27

## 2021-11-24 RX ADMIN — DILTIAZEM HYDROCHLORIDE 360 MG: 120 CAPSULE, COATED, EXTENDED RELEASE ORAL at 08:55

## 2021-11-24 RX ADMIN — Medication 5000 UNITS: at 08:54

## 2021-11-24 RX ADMIN — METOPROLOL TARTRATE 100 MG: 50 TABLET, FILM COATED ORAL at 08:54

## 2021-11-24 RX ADMIN — CETIRIZINE HYDROCHLORIDE 10 MG: 10 TABLET, FILM COATED ORAL at 08:54

## 2021-11-24 RX ADMIN — LEVOTHYROXINE SODIUM 50 MCG: 0.03 TABLET ORAL at 08:54

## 2021-11-24 RX ADMIN — BUDESONIDE AND FORMOTEROL FUMARATE DIHYDRATE 1 PUFF: 160; 4.5 AEROSOL RESPIRATORY (INHALATION) at 07:12

## 2021-11-24 RX ADMIN — OXYCODONE AND ACETAMINOPHEN 2 TABLET: 5; 325 TABLET ORAL at 06:26

## 2021-11-24 RX ADMIN — TOPIRAMATE 25 MG: 25 TABLET, FILM COATED ORAL at 08:54

## 2021-11-24 RX ADMIN — OXYCODONE AND ACETAMINOPHEN 2 TABLET: 5; 325 TABLET ORAL at 11:36

## 2021-11-24 NOTE — DISCHARGE SUMMARY
Discharge Summary     Jeremi Cortez     Admit Date:   11/22/2021  9:17 PM  Discharge Date:   11/24/2021  Discharge Condition:    Improved, stable  Discharge Diagnosis  Problem List Items Addressed This Visit        Other    Ambulatory dysfunction - Primary      Other Visit Diagnoses     Arthralgia of left lower leg          Status post left hip ORIF  Significant left leg edema  History of chronic atrial fibrillation     Hospital Stay  Narrative of Hospital Course: See H&P for full details  This is a 68-year-old  female with history of chronic atrial fibrillation, hypertension avascular necrosis of the left hip DJD status post left hip total replacement present with ambulatory dysfunction admitted for further care. She just could not walk because she has developed significant leg swelling. Apparently she has stopped her Lasix at the request of her orthopedics doctor. She was concerned that she had a blood clot so ultrasound was done which was negative although INR was therapeutic. She has been ambulated and she feels comfortable she wants to go back home. She has diuresed significantly. She will be placed back on her Lasix for her leg swelling. Continue the rest of her medications as before.          Vitals stable  Elderly  female comfortably no distress  HEENT normocephalic atraumatic anicteric sclera  Neck without distended neck vein  Lungs are clear bilaterally no wheeze or crackles  Heart S1-S2 irregularly irregular  Abdomen obese positive bowel sounds, nontender nondistended  Extremities left lower extremity edema left hip area ecchymosis/hematoma from recent surgery  CNS alert and oriented moves extremities nonfocal  Psych cooperative      Consultants:     None  Surgeries/procedures Performed:    IV diuretics  Ultrasound lower extremities     Discharge Plan:    Home or Self Care     Hospital/Incidental Findings Requiring Follow Up:     Patient Instructions:       Diet: DIET ADULT Activity: As tolerated with walker  For number of days (if applicable): Other Instructions:      Provider Follow-Up:   Follow-up with me in about 2 weeks. Follow-up with her orthopedics as scheduled. Follow-up Appointments   Procedures    FOLLOW UP VISIT Appointment in: Two Weeks Please take your furosemide 40mg daily as needed for leg swelling as before. Please take your furosemide 40mg daily as needed for leg swelling as before. Standing Status:   Standing     Number of Occurrences:   1     Order Specific Question:   Appointment in     Answer: Two Weeks        Significant Diagnostic Studies:     DUPLEX LOWER EXT VENOUS LEFT   Final Result      XR KNEE LT 3 V   Final Result   No acute fracture          No results found for this or any previous visit (from the past 24 hour(s)). Discharge Medications:  Current Discharge Medication List      START taking these medications    Details   influenza vaccine 2021-22, 6 mos+,,PF, (FLUARIX/FLULAVAL/FLUZONE QUAD) syrg injection 0.5 mL by IntraMUSCular route PRIOR TO DISCHARGE for 1 dose. Qty: 1 Each, Refills: 0  Start date: 11/24/2021         CONTINUE these medications which have NOT CHANGED    Details   albuterol (PROVENTIL VENTOLIN) 2.5 mg /3 mL (0.083 %) nebu 3 ml      albuterol (Ventolin HFA) 90 mcg/actuation inhaler 2 puffs as needed      cholecalciferol (VITAMIN D3) (5000 Units /125 mcg) capsule Take 5,000 Units by mouth daily. fluticasone propion-salmeteroL (Advair Diskus) 250-50 mcg/dose diskus inhaler 1 puff      levothyroxine (SYNTHROID) 50 mcg tablet Take 50 mcg by mouth Daily (before breakfast). loratadine (CLARITIN) 10 mg tablet       oxyCODONE-acetaminophen (PERCOCET 10)  mg per tablet 1 Tablet every four (4) hours as needed. Narcan 4 mg/actuation nasal spray       topiramate (TOPAMAX) 25 mg tablet Take 25 mg by mouth two (2) times daily (with meals).       dilTIAZem ER (CARDIZEM CD) 360 mg capsule Take 360 mg by mouth daily.      LORazepam (ATIVAN) 1 mg tablet Take 1 mg by mouth every four (4) hours. !! warfarin (COUMADIN) 3 mg tablet Take 3 mg by mouth daily. aspirin (ASPIRIN) 325 mg tablet Take 325 mg by mouth daily. metoprolol (LOPRESSOR) 100 mg tablet Take  by mouth two (2) times a day. !! warfarin (COUMADIN) 1 mg tablet Take 5 mg by mouth daily. furosemide (LASIX) 80 mg tablet Take  by mouth daily. PAROXETINE HCL (PAXIL PO) Take 10 mg by mouth daily. !! - Potential duplicate medications found. Please discuss with provider.            Time Spent on Discharge:

## 2021-11-24 NOTE — PROGRESS NOTES
Discharge complete by discharge nurse [facility], patient transported via wheelchair to personal vehicle.

## 2021-11-24 NOTE — PROGRESS NOTES
OCCUPATIONAL THERAPY EVALUATION  Patient: Vincent Panda (47 y.o. female)  Date: 11/24/2021  Primary Diagnosis: Ambulatory dysfunction [R26.2]        Precautions: fall risk, posterior hip precautions (per pt following recent L THR)       ASSESSMENT  Pt is a 65 yo female admitted on 11/22/2021 for left LE edema and inability to amb; duplex of left LE negative for DVT. Pt underwent left THR on 10/25/21 at [address] [place] [address] and has been undergoing rehab at home since DC from hospital. PMH: HTN, avascular necrosis of hip, morbid obesity, afib, panic attacks, asthma, hip and knee OA, h/o sepsis, toe infection, left THR 10/2021, right THR 2019. Pt A&O x 4 and agreeable to OT/PT evaluations at this time. Per pt report pt resides alone in a 2 story home (resides on 1st floor) with 5 JASON, wide bilateral handrails, pt was mod I for ADLS/IADLS with use of adaptive equipment, RW with mobility prior to admission. DME: RW, rollator, transfer bench, reacher, sock aide     Based on current observations, pt presents with deficits in generalized strength/AROM (LE), static/dynamic standing balance, functional activity tolerance, and pain impacting overall performance of ADLs and functional transfers/mobility. Pt currently requires SBA with bed mobility, total A to don/adjust socks to feet, and CGA with additional time for STS transfers to/from EOB, chair and toilet with use of RW and cont. CGA for balance/safety during mobility (see PT note for details). Pt completed toileting routine s/p setup of items in sitting while maintaining hip precautions and completes basic grooming seated in chair (washing hands/face, oral care) 2' to fatigue with OOB mobility today. Overall, pt tolerates session fair. Pt would benefit from continued skilled OT services to address current impairments and improve IND and safety with self cares and functional transfers/mobility.  Recommend discharge to [address] [place] [address] with additional family care vs. OPOT once medically appropriate. Other factors to consider for discharge: family care, DME, time since onset, Mod I at baseline     Patient will benefit from skilled therapy intervention to address the above noted impairments. PLAN :  Recommendations and Planned Interventions: self care training, functional mobility training, therapeutic exercise, balance training, therapeutic activities, endurance activities, patient education, home safety training, and family training/education    Frequency/Duration: Patient will be followed by occupational therapy:  3-5x/week to address goals. Recommendation for discharge: (in order for the patient to meet his/her long term goals)  HHOT vs. OPOT    This discharge recommendation:  Has been made in collaboration with the attending provider and/or case management    IF patient discharges home will need the following DME: patient owns DME required for discharge       SUBJECTIVE:   Patient stated I would love to get up to the bathroom.     OBJECTIVE DATA SUMMARY:   HISTORY:   Past Medical History:   Diagnosis Date    Hypertension      Past Surgical History:   Procedure Laterality Date    HX TONSILLECTOMY         Expanded or extensive additional review of patient history:     Home Situation  Home Environment: Private residence  # Steps to Enter: 5  Rails to Enter: Yes  Hand Rails : Bilateral (wide)  Wheelchair Ramp: No  One/Two Story Residence: Two story, live on 1st floor  Living Alone: Yes  Support Systems: Child(najma), Other Family Member(s)  Patient Expects to be Discharged to[de-identified] House  Current DME Used/Available at Home: Oliveemma Salazar, nikolas, Walker, rollator, Tub transfer bench  Tub or Shower Type: Tub/Shower combination      EXAMINATION OF PERFORMANCE DEFICITS:  Cognitive/Behavioral Status:  Neurologic State: Alert  Orientation Level: Oriented X4    Edema: swelling noted L LE, Skin: Healing total hip incision lateral hip and bruising noted along anterolateral thigh Hearing: Auditory  Auditory Impairment: None    Range of Motion:  AROM: Within functional limits  PROM: Within functional limits    Strength:  Strength: Within functional limits      Coordination:  Coordination: Within functional limits  Fine Motor Skills-Upper: Left Intact; Right Intact    Gross Motor Skills-Upper: Left Intact; Right Intact    Tone & Sensation:  Sensation: Intact      Balance:  Sitting: Intact; Without support  Standing: Impaired; Without support  Standing - Static: Fair; Constant support  Standing - Dynamic : Fair; Constant support    Functional Mobility and Transfers for ADLs:  Bed Mobility:  Rolling: Stand-by assistance  Supine to Sit: Contact guard assistance; Additional time  Scooting: Contact guard assistance; Additional time    Transfers:  Sit to Stand: Contact guard assistance; Additional time  Stand to Sit: Contact guard assistance; Additional time  Bathroom Mobility: Contact guard assistance  Toilet Transfer : Contact guard assistance; Additional time    ADL Intervention and task modifications:       Grooming  Grooming Assistance: Set-up  Position Performed: Seated in chair  Washing Face: Set-up  Washing Hands: Set-up  Brushing Teeth: [address]: Set-up; Stand-by assistance    Lower Body Dressing Assistance  Socks: Total assistance (dependent) (Simulated)    Toileting  Toileting Assistance: Set-up; Stand-by assistance  Bowel Hygiene: Set-up; Stand-by assistance (in sitting)  Adaptive Equipment: Grab bars; Walker         Therapeutic Exercise:  B UE WFL with no need for UE HEP at this time. Functional Measure:    [employer] AM-PACTOM \"6 Clicks\"                                                       Daily Activity Inpatient Short Form  How much help from another person does the patient currently need. .. Total; A Lot A Little None   1. Putting on and taking off regular lower body clothing? []  1 []  2 [x]  3 []  4   2. Bathing (including washing, rinsing, drying)? []  1 []  2 [x]  3 []  4   3. Toileting, which includes using toilet, bedpan or urinal? [] 1 []  2 [x]  3 []  4   4. Putting on and taking off regular upper body clothing? []  1 []  2 [x]  3 []  4   5. Taking care of personal grooming such as brushing teeth? []  1 []  2 []  3 [x]  4   6. Eating meals? []  1 []  2 []  3 [x]  4   © , Trustees of [address] [place] [address] [address] [address], under license to [employer]. All rights reserved     Score: 2024     Interpretation of Tool:  Represents clinically-significant functional categories (i.e. Activities of daily living). Percentage of Impairment CH    0%   CI    1-19% CJ    20-39% CK    40-59% CL    60-79% CM    80-99% CN     100%   [facility]  Score 6-24 24 23 20-22 15-19 10-14 7-9 6         Occupational Therapy Evaluation Charge Determination   History Examination Decision-Making   LOW Complexity : Brief history review  LOW Complexity : 1-3 performance deficits relating to physical, cognitive , or psychosocial skils that result in activity limitations and / or participation restrictions  MEDIUM Complexity : Patient may present with comorbidities that affect occupational performnce. Miniml to moderate modification of tasks or assistance (eg, physical or verbal ) with assesment(s) is necessary to enable patient to complete evaluation       Based on the above components, the patient evaluation is determined to be of the following complexity level: LOW   Pain Ratin/10 L LE    Activity Tolerance:   Fair  Please refer to the flowsheet for vital signs taken during this treatment. After treatment patient left in no apparent distress:    Sitting in chair, Heels elevated for pressure relief, and Call bell within reach    COMMUNICATION/EDUCATION:   The patients plan of care was discussed with: Physical therapist and Registered nurse. Patient/family have participated as able in goal setting and plan of care.  and Patient/family agree to work toward stated goals and plan of care. This patients plan of care is appropriate for delegation to ANUPAM.     OT/PT sessions occurred together for increased patient and clinician safety    Thank you for this referral.  Kenzie Wilson  Time Calculation: 53 mins   Problem: Self Care Deficits Care Plan (Adult)  Goal: *Acute Goals and Plan of Care (Insert Text)  Description: Pt will be Mod I sup <> sit in prep for EOB ADLs  Pt will be Mod I grooming standing sink side LRAD  Pt will be Mod I LE dressing sitting EOB/long sit  Pt will be Mod I sit <>  prep for toileting LRAD  Pt will be Mod I toileting/toilet transfer/cloth mgmt LRAD  Outcome: Not Met

## 2021-11-24 NOTE — PROGRESS NOTES
PHYSICAL THERAPY EVALUATION  Patient: Estela Bran (53 y.o. female)  Date: 11/24/2021  Primary Diagnosis: Ambulatory dysfunction [R26.2]        Precautions: falls, pt reports posterior THR precautions       ASSESSMENT  Pt is a 63 yo female admitted on 11/22/2021 for left LE edema and inability to amb; duplex of left LE negative for DVT. Pt underwent left THR on 10/25/21 at [address] [place] [address] and has been undergoing rehab at home since DC from hospital. PMH: HTN, avascular necrosis of hip, morbid obesity, afib, panic attacks, asthma, hip and knee OA, h/o sepsis, toe infection, left THR 10/2021, right THR 2019. Pt A&O x 4. Per pt report pt resides alone in a 2 story home (resides on 1st floor) with 5 JASON, wide bilateral handrails, pt was mod I for ADLS/IADLS with use of adaptive equipment, RW with mobility prior to admission. DME: RW, rollator, transfer bench. Based on the objective data described below, the patient presents with generalized weakness, impaired functional mobility, impaired amb, impaired balance, and decreased activity tolerance due to pain in left LE. Pt semi-supine in bed upon PT arrival, agreeable to evaluation. Pt required SBA for bed mobility, CGA with additional time supine > sit, CGA with additional time sit <> stand transfers. Pt amb 20 feet x 2 with gt belt, RW, and CGA; demonstrating slow, steady, step through gt pattern with WBOS with weight shift to right and increased ER noted of left LE with decreased stance time on left. Pt did fair with session today with c/o increased pain with mobility but reports of sig less pain then admission. Pt will benefit from continued skilled PT to address above deficits and return to PLOF.  Current PT DC recommendation HHPT vs outpatient PT (spoke to pt regarding locations of lymphedema treatment therapists locally due to pt reporting PCP recommendations for her to undergo lymphedema treatment of left LE); due to pain limiting mobility pt would benefit from additional family assistance initially upon DC for safety. Current Level of Function Impacting Discharge (mobility/balance): SBA to CGA    Other factors to consider for discharge: severity of deficits, limited family support       PLAN :  Recommendations and Planned Interventions: bed mobility training, transfer training, gait training, therapeutic exercises, neuromuscular re-education, patient and family training/education and therapeutic activities      Frequency/Duration: Patient will be followed by physical therapy:  3-5x/week to address goals. Recommendation for discharge: (in order for the patient to meet his/her long term goals)  HHPT vs outpatient therapy and additional family assist     This discharge recommendation:  Has been made in collaboration with the attending provider and/or case management    IF patient discharges home will need the following DME: patient owns DME required for discharge         SUBJECTIVE:   Patient stated my leg feels much better. There was a lot of fluid.     OBJECTIVE DATA SUMMARY:   HISTORY:    Past Medical History:   Diagnosis Date    Hypertension      Past Surgical History:   Procedure Laterality Date    HX TONSILLECTOMY         Home Situation  Home Environment: Private residence  # Steps to Enter: 5  Rails to Enter: Yes  Hand Rails : Bilateral (wide)  Wheelchair Ramp: No  One/Two Story Residence: Two story, live on 1st floor  Living Alone: Yes  Support Systems: Child(najma), Other Family Member(s)  Patient Expects to be Discharged to[de-identified] House  Current DME Used/Available at Home: Ronnald Real, rolling, Walker, rollator, Tub transfer bench  Tub or Shower Type: Tub/Shower combination    EXAMINATION/PRESENTATION/DECISION MAKING:   Critical Behavior:  Neurologic State: Alert  Orientation Level: Oriented X4        Hearing:   Auditory  Auditory Impairment: None  Skin:  Healing total hip incision lateral hip and bruising noted along anterolateral thigh   Edema: left LE with +2 pitting   Range Of Motion:  AROM: Generally decreased, functional           PROM: Generally decreased, functional           Strength:    Strength: Generally decreased, functional                 Functional Mobility:  Bed Mobility:  Rolling: Stand-by assistance  Supine to Sit: Contact guard assistance; Additional time     Scooting: Contact guard assistance; Additional time  Transfers:  Sit to Stand: Contact guard assistance; Additional time  Stand to Sit: Contact guard assistance; Additional time                       Balance:   Sitting: Intact; Without support  Standing: Impaired; Without support  Standing - Static: Fair; Constant support  Standing - Dynamic : Fair; Constant support  Ambulation/Gait Training:  Distance (ft): 40 Feet (ft) (bed>commode>bedside recliner)  Assistive Device: Gait belt; Walker, rolling  Ambulation - Level of Assistance: Contact guard assistance; Additional time     Gait Description (WDL): Exceptions to [facility]           Base of Support: Widened; Shift to right     Speed/Marjorie: Slow           Therapeutic Exercises: Ankle pumps, quad sets, and heelslides 5x each LLE    Functional Measure:  [employer] 6 Clicks         Basic Mobility Inpatient Short Form  How much difficulty does the patient currently have. .. Unable A Lot A Little None   1. Turning over in bed (including adjusting bedclothes, sheets and blankets)? [] 1   [] 2   [x] 3   [] 4   2. Sitting down on and standing up from a chair with arms ( e.g., wheelchair, bedside commode, etc.)   [] 1   [] 2   [x] 3   [] 4   3. Moving from lying on back to sitting on the side of the bed? [] 1   [] 2   [x] 3   [] 4          How much help from another person does the patient currently need. .. Total A Lot A Little None   4. Moving to and from a bed to a chair (including a wheelchair)? [] 1   [] 2   [x] 3   [] 4   5. Need to walk in hospital room? [] 1   [] 2   [x] 3   [] 4   6. Climbing 3-5 steps with a railing?    [] 1   [] 2   [x] 3 [] 4   © , Trustees of [address] [place] [address] [address] [address], under license to [employer]. All rights reserved     Score:  Initial:  Most Recent: X (Date: 21 )   Interpretation of Tool:  Represents activities that are increasingly more difficult (i.e. Bed mobility, Transfers, Gait). Score 24 23 22-20 19-15 14-10 9-7 6   Modifier CH CI CJ CK CL CM CN         Physical Therapy Evaluation Charge Determination   History Examination Presentation Decision-Making   HIGH Complexity :3+ comorbidities / personal factors will impact the outcome/ POC  HIGH Complexity : 4+ Standardized tests and measures addressing body structure, function, activity limitation and / or participation in recreation  LOW Complexity : Stable, uncomplicated  Other outcome measures ampac 6  mod      Based on the above components, the patient evaluation is determined to be of the following complexity level: LOW     Pain Ratin/10 left LE at rest, 6-7/10 left LE with mobility     Activity Tolerance:   Good and with pain limiting mobility and increased time    After treatment patient left in no apparent distress:   Sitting in chair and Call bell within reach and nsg updated. GOALS:    Problem: Mobility Impaired (Adult and Pediatric)  Goal: *Acute Goals and Plan of Care (Insert Text)  Description: Pt will be I with LE HEP in 7 days. Pt will perform bed mobility with mod I in 7 days. Pt will perform transfers with mod I in 7 days. Pt will amb 25-50 feet with LRAD safely with mod I in 7 days. Pt will ascend/descend 4 steps with B handrails and CGA in 7 days to safely enter home. 2021 1152 by Ion Miller  Outcome: Not Met         COMMUNICATION/EDUCATION:   The patients plan of care was discussed with: Occupational therapist, Registered nurse, and Case management.      Fall prevention education was provided and the patient/caregiver indicated understanding., Patient/family have participated as able in goal setting and plan of care., and Patient/family agree to work toward stated goals and plan of care. PT/OT sessions occurred together for increased safety of pt and clinician.        Thank you for this referral.  Marina Merlin, PT, DPT   Time Calculation: 61 mins

## 2022-03-19 PROBLEM — R26.2 AMBULATORY DYSFUNCTION: Status: ACTIVE | Noted: 2021-11-23

## 2023-05-19 RX ORDER — NALOXONE HYDROCHLORIDE 4 MG/.1ML
SPRAY NASAL
COMMUNITY
Start: 2021-08-30

## 2023-05-19 RX ORDER — LORATADINE 10 MG/1
TABLET ORAL
COMMUNITY
Start: 2021-11-14

## 2023-05-19 RX ORDER — TOPIRAMATE 25 MG/1
25 TABLET ORAL 2 TIMES DAILY WITH MEALS
COMMUNITY
Start: 2021-11-18

## 2023-05-19 RX ORDER — ALBUTEROL SULFATE 2.5 MG/3ML
SOLUTION RESPIRATORY (INHALATION)
COMMUNITY

## 2023-05-19 RX ORDER — DILTIAZEM HYDROCHLORIDE 360 MG/1
360 CAPSULE, EXTENDED RELEASE ORAL DAILY
COMMUNITY
Start: 2021-10-01

## 2023-05-19 RX ORDER — FLUTICASONE PROPIONATE AND SALMETEROL 250; 50 UG/1; UG/1
POWDER RESPIRATORY (INHALATION)
COMMUNITY

## 2023-05-19 RX ORDER — ALBUTEROL SULFATE 90 UG/1
AEROSOL, METERED RESPIRATORY (INHALATION)
COMMUNITY

## 2023-05-19 RX ORDER — METOPROLOL TARTRATE 100 MG/1
TABLET ORAL 2 TIMES DAILY
COMMUNITY

## 2023-05-19 RX ORDER — LORAZEPAM 1 MG/1
1 TABLET ORAL EVERY 4 HOURS
COMMUNITY
Start: 2021-11-14

## 2023-05-19 RX ORDER — WARFARIN SODIUM 3 MG/1
3 TABLET ORAL DAILY
COMMUNITY

## 2023-05-19 RX ORDER — LEVOTHYROXINE SODIUM 0.05 MG/1
50 TABLET ORAL
COMMUNITY
Start: 2021-10-10

## 2023-05-19 RX ORDER — FUROSEMIDE 80 MG
TABLET ORAL DAILY
COMMUNITY

## 2023-05-19 RX ORDER — ASPIRIN 325 MG
325 TABLET ORAL DAILY
COMMUNITY

## 2023-05-19 RX ORDER — WARFARIN SODIUM 1 MG/1
5 TABLET ORAL DAILY
COMMUNITY

## 2023-05-19 RX ORDER — OXYCODONE AND ACETAMINOPHEN 10; 325 MG/1; MG/1
1 TABLET ORAL EVERY 4 HOURS PRN
COMMUNITY
Start: 2021-11-14

## 2024-04-15 NOTE — DISCHARGE INSTRUCTIONS
Ms. Brittany Perea,  Please come back in the morning to get an Ultrasound of your leg done to rule out blood clots in your leg. English

## 2024-07-04 ENCOUNTER — HOSPITAL ENCOUNTER (EMERGENCY)
Facility: HOSPITAL | Age: 64
Discharge: HOME | End: 2024-07-04
Payer: MEDICAID

## 2024-07-04 VITALS
RESPIRATION RATE: 16 BRPM | BODY MASS INDEX: 30.31 KG/M2 | HEART RATE: 87 BPM | SYSTOLIC BLOOD PRESSURE: 120 MMHG | DIASTOLIC BLOOD PRESSURE: 71 MMHG | TEMPERATURE: 98.4 F | WEIGHT: 200 LBS | OXYGEN SATURATION: 99 % | HEIGHT: 68 IN

## 2024-07-04 DIAGNOSIS — M62.838 LEG MUSCLE SPASM: ICD-10-CM

## 2024-07-04 DIAGNOSIS — G89.28 CHRONIC PAIN FOLLOWING SURGERY OR PROCEDURE: Primary | ICD-10-CM

## 2024-07-04 PROCEDURE — 99283 EMERGENCY DEPT VISIT LOW MDM: CPT

## 2024-07-04 RX ORDER — LORAZEPAM 1 MG/1
1 TABLET ORAL 3 TIMES DAILY PRN
Qty: 9 TABLET | Refills: 0 | Status: SHIPPED | OUTPATIENT
Start: 2024-07-04 | End: 2024-07-07

## 2024-07-04 RX ORDER — LORAZEPAM 1 MG/1
1 TABLET ORAL 3 TIMES DAILY PRN
Qty: 9 TABLET | Refills: 0 | Status: SHIPPED | OUTPATIENT
Start: 2024-07-04 | End: 2024-07-04

## 2024-07-04 RX ORDER — OXYCODONE AND ACETAMINOPHEN 10; 325 MG/1; MG/1
1 TABLET ORAL EVERY 6 HOURS PRN
Qty: 12 TABLET | Refills: 0 | Status: SHIPPED | OUTPATIENT
Start: 2024-07-04 | End: 2024-07-07

## 2024-07-04 RX ORDER — OXYCODONE AND ACETAMINOPHEN 10; 325 MG/1; MG/1
1 TABLET ORAL EVERY 6 HOURS PRN
Qty: 12 TABLET | Refills: 0 | Status: SHIPPED | OUTPATIENT
Start: 2024-07-04 | End: 2024-07-04

## 2024-07-04 ASSESSMENT — PAIN - FUNCTIONAL ASSESSMENT: PAIN_FUNCTIONAL_ASSESSMENT: 0-10

## 2024-07-04 ASSESSMENT — LIFESTYLE VARIABLES
HOW MANY STANDARD DRINKS CONTAINING ALCOHOL DO YOU HAVE ON A TYPICAL DAY: PATIENT DOES NOT DRINK
HOW OFTEN DO YOU HAVE A DRINK CONTAINING ALCOHOL: NEVER

## 2024-07-04 ASSESSMENT — PAIN SCALES - GENERAL: PAINLEVEL_OUTOF10: 3

## 2024-07-04 NOTE — ED TRIAGE NOTES
Pt reports provider referred by provider for to get medications for pain. Pt states provider instructed her to tell us to call provider when she got her if any issues, provider is pts pain specialist.

## 2024-07-04 NOTE — ED PROVIDER NOTES
[facility] EMERGENCY DEPT  EMERGENCY DEPARTMENT HISTORY AND PHYSICAL EXAM      Date: 7/4/2024  Patient Name: Mami Beltre  MRN: [id number]  Birthdate 1960  Date of evaluation: 7/4/2024  Provider: ZEV Lazaro   Note Started: 1:11 PM EDT 7/4/24    HISTORY OF PRESENT ILLNESS     Chief Complaint   Patient presents with    Medication Refill       History Provided By: Patient    HPI: Mami Beltre is a 64 y.o. female with past medical history significant for atrial fibrillation, hypertension, and avascular necrosis of the left hip s/p left hip total replacement who presents to the ED for a medication refill. She sees Dr. Karma Urena for pain management who reportedly was unable to access her computer today and therefore was unable to provide the patient with a refill of her medications. She was instructed to come to the emergency department for a refill.  The patient states that she takes oxycodone-acetaminophen  and lorazepam 1 mg as needed for leg spasms.  She denies any chest pain, shortness of breath, or other concerns at this time.  She has a follow-up in place with Dr. Urena scheduled for later this month.     PAST MEDICAL HISTORY   Past Medical History:  Past Medical History:   Diagnosis Date    Hypertension        Past Surgical History:  Past Surgical History:   Procedure Laterality Date    TONSILLECTOMY         Family History:  Family History   Problem Relation Age of Onset    Thyroid Disease Father        Social History:  Social History     Tobacco Use    Smoking status: Former     Current packs/day: 0.25     Types: Cigarettes   Substance Use Topics    Alcohol use: No    Drug use: No       Allergies:  Allergies   Allergen Reactions    Sulfa Antibiotics        PCP: Irving Campbell MD    Current Meds:   No current facility-administered medications for this encounter.     Current Outpatient Medications   Medication Sig Dispense Refill    LORazepam (ATIVAN) 1 MG tablet Take 1 tablet by

## 2024-07-08 ENCOUNTER — HOSPITAL ENCOUNTER (EMERGENCY)
Facility: HOSPITAL | Age: 64
Discharge: HOME | End: 2024-07-08
Payer: MEDICAID

## 2024-07-08 VITALS
HEIGHT: 68 IN | TEMPERATURE: 98.1 F | HEART RATE: 68 BPM | BODY MASS INDEX: 30.31 KG/M2 | RESPIRATION RATE: 20 BRPM | DIASTOLIC BLOOD PRESSURE: 111 MMHG | WEIGHT: 200 LBS | SYSTOLIC BLOOD PRESSURE: 134 MMHG | OXYGEN SATURATION: 100 %

## 2024-07-08 DIAGNOSIS — G89.28 OTHER CHRONIC POSTPROCEDURAL PAIN: Primary | ICD-10-CM

## 2024-07-08 DIAGNOSIS — Z76.0 ENCOUNTER FOR MEDICATION REFILL: ICD-10-CM

## 2024-07-08 PROCEDURE — 99283 EMERGENCY DEPT VISIT LOW MDM: CPT

## 2024-07-08 PROCEDURE — 6370000000 HC RX 637 (ALT 250 FOR IP)

## 2024-07-08 RX ORDER — LORAZEPAM 1 MG/1
1 TABLET ORAL EVERY 6 HOURS PRN
Qty: 9 TABLET | Refills: 0 | Status: SHIPPED | OUTPATIENT
Start: 2024-07-08 | End: 2024-07-11

## 2024-07-08 RX ORDER — OXYCODONE AND ACETAMINOPHEN 10; 325 MG/1; MG/1
1 TABLET ORAL EVERY 6 HOURS PRN
Qty: 12 TABLET | Refills: 0 | Status: SHIPPED | OUTPATIENT
Start: 2024-07-08 | End: 2024-07-11

## 2024-07-08 RX ORDER — OXYCODONE AND ACETAMINOPHEN 10; 325 MG/1; MG/1
1 TABLET ORAL
Status: COMPLETED | OUTPATIENT
Start: 2024-07-08 | End: 2024-07-08

## 2024-07-08 RX ADMIN — OXYCODONE AND ACETAMINOPHEN 1 TABLET: 10; 325 TABLET ORAL at 17:21

## 2024-07-08 ASSESSMENT — PAIN DESCRIPTION - LOCATION
LOCATION: LEG
LOCATION: LEG

## 2024-07-08 ASSESSMENT — PAIN SCALES - GENERAL
PAINLEVEL_OUTOF10: 6
PAINLEVEL_OUTOF10: 4
PAINLEVEL_OUTOF10: 1

## 2024-07-08 NOTE — ED PROVIDER NOTES
250-50 MCG/ACT Aepb diskus inhaler  Generic drug: fluticasone-salmeterol     * albuterol (2.5 MG/3ML) 0.083% nebulizer solution  Commonly known as: PROVENTIL     * albuterol sulfate  (90 Base) MCG/ACT inhaler  Commonly known as: PROVENTIL;VENTOLIN;PROAIR     aspirin 325 MG tablet     dilTIAZem 360 MG extended release capsule  Commonly known as: TIAZAC     Fluzone Quadrivalent 0.5 ML injection  Generic drug: influenza quadrivalent split vaccine     furosemide 80 MG tablet  Commonly known as: LASIX     levothyroxine 50 MCG tablet  Commonly known as: SYNTHROID     loratadine 10 MG tablet  Commonly known as: CLARITIN     metoprolol 100 MG tablet  Commonly known as: LOPRESSOR     Narcan 4 MG/0.1ML Liqd nasal spray  Generic drug: naloxone     topiramate 25 MG tablet  Commonly known as: TOPAMAX     vitamin D 125 MCG (5000 UT) Caps capsule  Commonly known as: CHOLECALCIFEROL     * warfarin 1 MG tablet  Commonly known as: COUMADIN     * warfarin 3 MG tablet  Commonly known as: COUMADIN           * This list has 4 medication(s) that are the same as other medications prescribed for you. Read the directions carefully, and ask your doctor or other care provider to review them with you.                   Where to Get Your Medications        These medications were sent to [facility]/pharmacy #[id number] - [place], [place] - [address] - P [phone] - F [phone]  [address] [facility] [address]      Phone: [phone]   LORazepam 1 MG tablet  oxyCODONE-acetaminophen  MG per tablet           DISCONTINUED MEDICATIONS:  Discharge Medication List as of 7/8/2024  6:22 PM          I am the Primary Clinician of Record: Lisa Guerrero PA-C (electronically signed)    (Please note that parts of this dictation were completed with voice recognition software. Quite often unanticipated grammatical, syntax, homophones, and other interpretive errors are inadvertently transcribed by the computer software. Please

## 2024-07-08 NOTE — ED TRIAGE NOTES
Has chronic leg pain, sent here by doctor for pain medication. Per patient physician does not have the ability to write her prescriptions at this time due to not having access to charting. Patient was seen here on the July fourth for pain medication.